# Patient Record
(demographics unavailable — no encounter records)

---

## 2017-04-13 NOTE — DIAGNOSTIC IMAGING REPORT
CHEST ONE VIEW PORTABLE



CLINICAL HISTORY: CHEST PAIN dyspnea



COMPARISON STUDY:  5/3/2015



FINDINGS: The bones soft tissues and hemidiaphragms are normal. The

cardiomediastinal silhouette is normal. The lungs are clear. The pulmonary

vasculature is normal. 



IMPRESSION:  Negative chest. 









Electronically signed by:  Wes Christopher M.D.

4/13/2017 4:10 PM



Dictated Date/Time:  4/13/2017 4:10 PM

## 2017-04-13 NOTE — HISTORY AND PHYSICAL
History & Physical


Date & Time of Service:


Apr 13, 2017 at 19:05


Chief Complaint:


Headache,Vomiting,Fever, Sore Throat, Cough,Copd


Primary Care Physician:


April Mc D.O.


History of Present Illness


Source:  patient, clinic records, hospital records


Patient seen and examined. 58 year old female with PMHx of COPD active tobacco 

abuse, and fibromyalgia presents to the ED complaining of wheezing x 3 days. 

Patient reports she has been wheezing much more than her baseline. She reports 

associated SOB, and cough with sputum production. She reports subjective fevers

, nausea, and a few episodes of vomiting. She states her granddaughter had 

strep throat last week. She states she has been using her nebulizers at home 

which help. She denies chest pain, palpitation, diarrhea, dysuria, calf pain 

and edema. She does not use oxygen at home. She states she started a Zpak last 

night. She did not start any steroids. She has been smoking a pack a day. In 

the ED VS are initially stable, later hypoxia is recorded. Patient is placed on

  2L NC and saturating well. WBC is 18K. CXR is without consolidation. Flu and 

rapid strep are negative. She received Duonebs x 2, Rocephin and IV Solu-

Medrol. She is resting comfortably. She will be admitted for further workup and 

treatment.





Past Medical/Surgical History


Medical Problems:


(1) COPD (chronic obstructive pulmonary disease)


Status: Chronic  





(2) Fibromyalgia


Status: Chronic  





(3) HLD (hyperlipidemia)


Status: Chronic  





(4) HTN (hypertension)


Status: Chronic  





Surgical Problems:


(1) No pertinent past surgical history


Status: Chronic  





Social History Problems:


(1) Active smoker


Status: Chronic  








Family History





Depression


Diabetes mellitus





Social History


Smoking Status:  Current Every Day Smoker


Alcohol Use:  occasionally


Drug Use:  none


Marital Status:  


Housing status:  lives with family


Occupational Status:  unemployed





Immunizations


History of Influenza Vaccine:  No


Influenza Vaccine Date:  Oct 20, 2008


History of Tetanus Vaccine?:  No


History of Pneumococcal:  Yes


History of Hepatitis B Vaccine:  No





Multi-Drug Resistant Organisms


History of MDRO:  No





Allergies


Coded Allergies:  


     ACE Inhibitors (Verified  Allergy, Unknown, UNKNOWN RXN, 5/3/15)


     Zolpidem (Verified  Adverse Reaction, Intermediate, other, 5/3/15)


 irritability, restless legs, wanted to wake up at night


 during sleep





Home Medications


Scheduled


Azithromycin (Azithromycin), 1 TAB PO UD


Citalopram (Citalopram Hydrobromide), 40 MG PO DAILY


Fluticasone Furoate-Vilanterol (Breo Ellipta), 1 PUFF INH QAM





Scheduled PRN


Albuterol Sulfate (Proair Respiclick), 2 PUFFS INH QID PRN for SOB/Wheezing


Ibuprofen (Advil), 400-600 MG PO Q6H PRN for Pain


Ipratropium-Albuterol (Duoneb), 1 TREATMENT INH Q6H PRN for SOB/Wheezing





Review of Systems


See above for pertinent positives & negatives. A total of 10 systems reviewed 

and were otherwise negative.





Physical Exam


Vital Signs











  Date Time  Temp Pulse Resp B/P Pulse Ox O2 Delivery O2 Flow Rate FiO2


 


4/13/17 18:41  97 18 117/70 93 Nasal Cannula 2.0 


 


4/13/17 18:17  98 22 131/69 92 Nasal Cannula  


 


4/13/17 16:54     86 Nasal Cannula 2.0 


 


4/13/17 16:47  96 24 132/76 88 Room Air  


 


4/13/17 16:30  94      


 


4/13/17 16:20     93 Room Air  


 


4/13/17 16:20     93 Room Air  


 


4/13/17 15:08 37.4 113 20 126/85 93 Room Air  








General Appearance:  + pertinent finding (WD/WN 58 year old female lying in bed 

in NAD )


Head:  normocephalic, atraumatic


Eyes:  PERRL, EOMI, sclerae normal


ENT:  hearing grossly normal, pharynx normal


Neck:  supple, no JVD


Respiratory/Chest:  chest non-tender, no respiratory distress, no accessory 

muscle use, + pertinent finding (Coarse breath sounds, good air entry, no 

wheezes noted s/p duoneb x 2)


Cardiovascular:  regular rate, rhythm, no edema, no gallop, no JVD, no murmur, 

normal peripheral pulses


Abdomen/GI:  normal bowel sounds, non tender, soft


Back:  normal inspection, no muscle spasm


Extremities/Musculoskelatal:  no calf tenderness, normal capillary refill, no 

pedal edema


Neurologic/Psych:  alert, oriented x 3, + pertinent finding (no focal deficits 

noted on gross exam )


Skin:  normal color, warm/dry, no rash


Lymphatic:  no adenopathy





Diagnostics


Laboratory Results





Results Past 24 Hours








Test


  4/13/17


16:08 4/13/17


16:10 4/13/17


18:19 Range/Units


 


 


Influenza Type A (RT-PCR) Neg for Influ A   NEG  


 


Influenza Type A Antigen Neg for Influ A   NEG  


 


Influenza Type B Antigen Neg for Influ B   NEG  


 


Influenza Type B (RT-PCR) Neg for Influ B   NEG  


 


White Blood Count  18.22  4.8-10.8  K/uL


 


Red Blood Count  4.55  4.2-5.4  M/uL


 


Hemoglobin  15.2  12.0-16.0  g/dL


 


Hematocrit  43.0  37-47  %


 


Mean Corpuscular Volume  94.5    fL


 


Mean Corpuscular Hemoglobin  33.4  25-34  pg


 


Mean Corpuscular Hemoglobin


Concent 


  35.3


  


  32-36  g/dl


 


 


Platelet Count  239  130-400  K/uL


 


Mean Platelet Volume  9.7  7.4-10.4  fL


 


Neutrophils (%) (Auto)  82.0   %


 


Lymphocytes (%) (Auto)  10.2   %


 


Monocytes (%) (Auto)  6.9   %


 


Eosinophils (%) (Auto)  0.3   %


 


Basophils (%) (Auto)  0.2   %


 


Neutrophils # (Auto)  14.95  1.4-6.5  K/uL


 


Lymphocytes # (Auto)  1.86  1.2-3.4  K/uL


 


Monocytes # (Auto)  1.25  0.11-0.59  K/uL


 


Eosinophils # (Auto)  0.05  0-0.5  K/uL


 


Basophils # (Auto)  0.04  0-0.2  K/uL


 


RDW Standard Deviation  49.6  36.4-46.3  fL


 


RDW Coefficient of Variation  14.5  11.5-14.5  %


 


Immature Granulocyte % (Auto)  0.4   %


 


Immature Granulocyte # (Auto)  0.07  0.00-0.02  K/uL


 


Sodium Level  140  136-145  mmol/L


 


Potassium Level  3.8  3.5-5.1  mmol/L


 


Chloride Level  105    mmol/L


 


Carbon Dioxide Level  27  21-32  mmol/L


 


Anion Gap  8.0  3-11  mmol/L


 


Blood Urea Nitrogen  8  7-18  mg/dl


 


Creatinine


  


  0.65


  


  0.60-1.20


mg/dl


 


Est Creatinine Clear Calc


Drug Dose 


  84.9


  


   ml/min


 


 


Estimated GFR (


American) 


  113.4


  


   


 


 


Estimated GFR (Non-


American 


  97.9


  


   


 


 


BUN/Creatinine Ratio  12.7  10-20  


 


Random Glucose  83  70-99  mg/dl


 


Calcium Level  8.8  8.5-10.1  mg/dl


 


Total Bilirubin  0.7  0.2-1  mg/dl


 


Direct Bilirubin  0.1  0-0.2  mg/dl


 


Aspartate Amino Transf


(AST/SGOT) 


  11


  


  15-37  U/L


 


 


Alanine Aminotransferase


(ALT/SGPT) 


  19


  


  12-78  U/L


 


 


Alkaline Phosphatase  115    U/L


 


Troponin I  < 0.015  0-0.045  ng/ml


 


Total Protein  7.3  6.4-8.2  gm/dl


 


Albumin  3.7  3.4-5.0  gm/dl








 Microbiology Results


4/13/17 Group A Streptococcus Screen - Final, Resulted


          SPECIMEN NEGATIVE FOR GROUP A BETA ST...


4/13/17 Group A Streptococcus Screen ([facility]), Resulted


          Pending





Diagnostic Radiology


CXR


Per radiologist read:





IMPRESSION:  Negative chest.





EKG


NSR 93 BPM, Qtc 432





Impression


Assessment and Plan


58 year old female smoking 1 pack per day presents to the ED complaining of 

increased cough, sputum production, SOB 





ACUTE EXACERBATION OF COPD with acute hypoxia 


-Admit to med/surg 


-CXR without pneumonia, flu, rapid strep negative 


-WBC count 18K, no recent steroids, will trend 


-Became hypoxic on RA during stay saturating well on 2L NC 


-received IV Solu-Medrol, duonebs x 2 and Rocephin, IVFs in ED, feeling much 

better 


-Check sputum culture 


-Will continue Rocephin daily, and Zpak patient started at home yesterday 


-Received 125mg IV  Solu-Medrol in ED will start prednisone 40mg po in AM 


-Duonebs scheduled and prn sob/wheeze


-continue supplemental oxygen as needed 


-Smoking cessation counseling 





FIBROMYALGIA 


-continue Celexa 





HTN 


-per chart, not on home meds


-BP stable, monitor per routine 





TOBACCO ABUSE


-Cessation counseling given 


-Nicotine patch ordered 





DVT PROPHYLAXIS: Sq Lovenox 





CODE STATUS: FULL CODE





DISPO:In my clinical judgment this beneficiary meets acute admission criteria, 

established by CMS, that includes being hospitalized through two midnights.





Patient seen in collaboration with Dr. Begum





VTE Prophylaxis


VTE Risk Assessment Done? Y/N:  Yes


Risk Level:  Moderate


Given or contraindicated:  Enoxaparin (Lovenox)SQ





Note





ATTENDING ADDENDUM





Record reviewed.


Patient interviewed and examined.


Care coordinated with Annita Espinoza PA-C.


Please refer to her documentation for patient's history.





EXAM:





General-  coughing, no acute distress


VS- as noted


Neck-  no JVD


Lungs-  scattered rhonchi, diffuse wheezing


Heart-  RRR


Abdomen-  + BS, soft, nontender


Extremities-  no pretibial edema or calf tenderness


Neuro-  alert





DATA:





WBC 18,220.


Lactate = 0.60.


Other lab studies as noted.





CXR- no infiltrates








ASSESSMENT AND PLAN:





Exacerbation of COPD due to bronchitis.


Rx with azithromycin, ceftriaxone, steroids, bronchodilators.





Please refer to MAKAYLA Espinoza's documentation for discussion of other issues.





Edmond Begum MD


.

## 2017-04-13 NOTE — EMERGENCY ROOM VISIT NOTE
History


First contact with patient:  15:11


Chief Complaint:  ILLNESS


Stated Complaint:  HEADACHE,VOMITING,FEVER, SORE THROAT, COUGH,COPD





History of Present Illness


The patient is a 58 year old female who presents to the Emergency Room with 

complaints of cough, congestion, wheezing, headache, fever, chills for the past 

day.  Other family members are currently sick.  Patient continues to smoke.  

She is COPD.  She tried home with minimal improvement of symptoms.  She took 

some Zithromax that she had for her exacerbations of her COPD.  Patient denies 

chest pain, abdominal pain, vomiting, diarrhea, neck stiffness.  She is 

tolerated by mouth fluids and food.





Review of Systems


See HPI for pertinent positives & negatives. A total of 10 systems reviewed and 

were otherwise negative.





Past Medical/Surgical History


Medical Problems:


(1) Benign hypertension


(2) Bronchitis


(3) COPD (chronic obstructive pulmonary disease)


(4) Pneumonia


Social History Problems:


(1) Active smoker








Social History


Smoking Status:  Current Every Day Smoker


Drug Use:  none


Marital Status:  


Housing Status:  lives with family


Occupation Status:  unemployed





Current/Historical Medications


Scheduled


Albuterol Sulfate (Proair Respiclick), 2 PUFFS INH QID


Azithromycin (Azithromycin), 1 TAB PO UD


Citalopram (Citalopram Hydrobromide), 40 MG PO DAILY


Fluticasone Furoate-Vilanterol (Breo Ellipta), 1 PUFF INH QAM


Ipratropium-Albuterol (Duoneb), 1 TREATMENT INH Q6H





Scheduled PRN


Ibuprofen (Advil), 400-600 MG PO Q6H PRN for Pain





Allergies


Coded Allergies:  


     ACE Inhibitors (Verified  Allergy, Unknown, UNKNOWN RXN, 5/3/15)


     Zolpidem (Verified  Adverse Reaction, Intermediate, other, 5/3/15)


 irritability, restless legs, wanted to wake up at night


 during sleep





Physical Exam


Vital Signs











  Date Time  Temp Pulse Resp B/P Pulse Ox O2 Delivery O2 Flow Rate FiO2


 


4/13/17 18:41  97 18 117/70 93 Nasal Cannula 2.0 


 


4/13/17 18:17  98 22 131/69 92 Nasal Cannula  


 


4/13/17 16:54     86 Nasal Cannula 2.0 


 


4/13/17 16:47  96 24 132/76 88 Room Air  


 


4/13/17 16:30  94      


 


4/13/17 16:20     93 Room Air  


 


4/13/17 16:20     93 Room Air  


 


4/13/17 15:08 37.4 113 20 126/85 93 Room Air  











Physical Exam


VITALS: Vitals are noted on the nurse's note and reviewed by myself.  Vital 

signs stable.


GENERAL: Pleasant female, in no acute distress, nondiaphoretic, well-developed 

well-nourished.


SKIN: The skin was without rashes, erythema, edema, or bruising.  There is no 

tenting of the skin.  Capillary reflex less than 2 seconds.


HEAD: Normocephalic atraumatic.  


EARS: External auditory canals clear, tympanic membranes pearly gray without 

erythema or effusion bilaterally.


EYES: Pupils equal round and reactive to light and accommodation.  Conjunctivae 

without injection, sclerae without icterus.  Extraocular movements intact.  


NOSE: Patent, turbinates without inflammation or discharge.  No sinus 

tenderness.


MOUTH: Mucous membranes moist.     Pharynx without erythema or exudate.  Uvula 

midline.  Airway patent.  Tongue does not deviate.  


NECK: Supple without nuchal rigidity.  No lymphadenopathy.  No thyromegaly.  

Cervical spine is nontender.  No JVD.


HEART: Regular rate and rhythm 


LUNGS: Diffuse inspiratory and end expiratory wheezes, without rales or 

rhonchi.  No dullness to percussion.  No retractions or accessory muscle use.


ABDOMEN: Positive bowel sounds x 4.  Normal tympanic percussion.  Soft, 

nontender, without masses or organomegaly.  Wood sign negative.  No guarding 

or rebound tenderness.


MUSCULOSKELETAL: No muscle atrophy, erythema, or edema noted.   


NEURO: Patient was alert and oriented to person place and time.  Normal 

sensation to light and sharp touch.  No focal neurological deficits.





Medical Decision & Procedures


Laboratory Results


4/13/17 16:10








Red Blood Count 4.55, Mean Corpuscular Volume 94.5, Mean Corpuscular Hemoglobin 

33.4, Mean Corpuscular Hemoglobin Concent 35.3, Mean Platelet Volume 9.7, 

Neutrophils (%) (Auto) 82.0, Lymphocytes (%) (Auto) 10.2, Monocytes (%) (Auto) 

6.9, Eosinophils (%) (Auto) 0.3, Basophils (%) (Auto) 0.2, Neutrophils # (Auto) 

14.95, Lymphocytes # (Auto) 1.86, Monocytes # (Auto) 1.25, Eosinophils # (Auto) 

0.05, Basophils # (Auto) 0.04





4/13/17 16:10

















Test


  4/13/17


16:08 4/13/17


16:10 4/13/17


18:19


 


Influenza Type A (RT-PCR)


  Neg for Influ


A (NEG) 


  


 


 


Influenza Type A Antigen


  Neg for Influ


A (NEG) 


  


 


 


Influenza Type B Antigen


  Neg for Influ


B (NEG) 


  


 


 


Influenza Type B (RT-PCR)


  Neg for Influ


B (NEG) 


  


 


 


White Blood Count


  


  18.22 K/uL


(4.8-10.8) 


 


 


Red Blood Count


  


  4.55 M/uL


(4.2-5.4) 


 


 


Hemoglobin


  


  15.2 g/dL


(12.0-16.0) 


 


 


Hematocrit  43.0 % (37-47)  


 


Mean Corpuscular Volume


  


  94.5 fL


() 


 


 


Mean Corpuscular Hemoglobin


  


  33.4 pg


(25-34) 


 


 


Mean Corpuscular Hemoglobin


Concent 


  35.3 g/dl


(32-36) 


 


 


Platelet Count


  


  239 K/uL


(130-400) 


 


 


Mean Platelet Volume


  


  9.7 fL


(7.4-10.4) 


 


 


Neutrophils (%) (Auto)  82.0 %  


 


Lymphocytes (%) (Auto)  10.2 %  


 


Monocytes (%) (Auto)  6.9 %  


 


Eosinophils (%) (Auto)  0.3 %  


 


Basophils (%) (Auto)  0.2 %  


 


Neutrophils # (Auto)


  


  14.95 K/uL


(1.4-6.5) 


 


 


Lymphocytes # (Auto)


  


  1.86 K/uL


(1.2-3.4) 


 


 


Monocytes # (Auto)


  


  1.25 K/uL


(0.11-0.59) 


 


 


Eosinophils # (Auto)


  


  0.05 K/uL


(0-0.5) 


 


 


Basophils # (Auto)


  


  0.04 K/uL


(0-0.2) 


 


 


RDW Standard Deviation


  


  49.6 fL


(36.4-46.3) 


 


 


RDW Coefficient of Variation


  


  14.5 %


(11.5-14.5) 


 


 


Immature Granulocyte % (Auto)  0.4 %  


 


Immature Granulocyte # (Auto)


  


  0.07 K/uL


(0.00-0.02) 


 


 


Anion Gap


  


  8.0 mmol/L


(3-11) 


 


 


Est Creatinine Clear Calc


Drug Dose 


  84.9 ml/min 


  


 


 


Estimated GFR (


American) 


  113.4 


  


 


 


Estimated GFR (Non-


American 


  97.9 


  


 


 


BUN/Creatinine Ratio  12.7 (10-20)  


 


Calcium Level


  


  8.8 mg/dl


(8.5-10.1) 


 


 


Total Bilirubin


  


  0.7 mg/dl


(0.2-1) 


 


 


Direct Bilirubin


  


  0.1 mg/dl


(0-0.2) 


 


 


Aspartate Amino Transf


(AST/SGOT) 


  11 U/L (15-37) 


  


 


 


Alanine Aminotransferase


(ALT/SGPT) 


  19 U/L (12-78) 


  


 


 


Alkaline Phosphatase


  


  115 U/L


() 


 


 


Troponin I


  


  < 0.015 ng/ml


(0-0.045) 


 


 


Total Protein


  


  7.3 gm/dl


(6.4-8.2) 


 


 


Albumin


  


  3.7 gm/dl


(3.4-5.0) 


 











Medications Administered











 Medications


  (Trade)  Dose


 Ordered  Sig/Carl


 Route  Start Time


 Stop Time Status Last Admin


Dose Admin


 


 Sodium Chloride


  (Nss 1000ml)  1,000 ml @ 


 125 mls/hr  Q8H STAT


 IV  4/13/17 15:57


 4/13/17 23:56  4/13/17 16:19


125 MLS/HR


 


 Methylprednisolone


 Sodium Succinate


  (Solu-Medrol IV)  125 mg  NOW  STAT


 IV  4/13/17 15:57


 4/13/17 15:59 DC 4/13/17 16:19


125 MG


 


 Albuterol/


 Ipratropium


  (Duoneb)  3 ml  NOW  STAT


 INH  4/13/17 15:57


 4/13/17 15:59 DC 4/13/17 16:39


3 ML


 


 Ondansetron HCl


  (Zofran Inj)  4 mg  NOW  STAT


 IV  4/13/17 15:57


 4/13/17 15:59 DC 4/13/17 16:19


4 MG


 


 Famotidine


  (Pepcid 20mg/100


 ml)  20 mg  ONE  STAT


 IV  4/13/17 15:57


 4/13/17 15:59 DC 4/13/17 16:19


20 MG


 


 Albuterol/


 Ipratropium


  (Duoneb)  3 ml  NOW  STAT


 INH  4/13/17 17:34


 4/13/17 17:35 DC 4/13/17 17:47


3 ML


 


 Ceftriaxone Sodium


  (Rocephin Inj)  1 gm  NOW  STAT


 IV  4/13/17 18:22


 4/13/17 18:23 DC 4/13/17 18:39


1 GM











ED Course


Prior records/ancillary studies reviewed.


Triage Nursing notes reviewed.


Additional history obtained from the family.





The patient's history was concerning for respiratory difficulties.





Differential diagnosis:


Etiologies such as infections, reactive airway disease, pneumonia, pneumothorax

, COPD, CHF, cardiac ischemia, pulmonary embolism, musculoskeletal, 

gastrointestinal, as well as others were entertained.





Physical examination:


As above. 





ER treatment provided:


Steroids, nebulizer


On reassessment the patient felt better.





Diagnostic interpretation by me:


The electrocardiogram was negative for acute ischemic or pathologic change.  

Normal sinus, normal intervals, no acute ST-T wave changes.  Impression normal 

sinus rhythm interpreted by myself





The labs revealed leukocytosis, no anemia negative flu.  Negative strep test





Imaging studies:


Chest x-ray as above.


CHEST ONE VIEW PORTABLE





CLINICAL HISTORY: CHEST PAIN dyspnea





COMPARISON STUDY:  5/3/2015





FINDINGS: The bones soft tissues and hemidiaphragms are normal. The


cardiomediastinal silhouette is normal. The lungs are clear. The pulmonary


vasculature is normal. 





IMPRESSION:  Negative chest. 














Electronically signed by:  Wes Christopher M.D.





Consultation: Hospitalist service from [facility] was consulted, ZAC Ariza 

and will evaluate the patient for possible admission.  Please see their 

dictation for further admission related to treatment plan.





This appears to be consistent with COPD exacerbation.  Patient's pulse ox did 

drop to the mid 80s.  Patient does not normally wear oxygen.  Patient no 

pneumonia on x-ray.   Patient will be evaluated by medicine for possible 

admission.  By the evaluation outlined above emergent etiologies such as CHF, 

cardiac ischemia, pulmonary embolism, pneumonia, pneumothorax, musculoskeletal, 

serious bacterial infections, as well as others were deemed relatively unlikely.





The pt informed about the findings as listed above. All questions were answered 

and  pleased with the treatment. 


Case reviewed with my attending





Medical Decision


As above





Impression





 Primary Impression:  


 COPD exacerbation


 Additional Impression:  


 Hypoxemia





Departure Information


Dispostion


Being Evaluated By Hospitalist





Condition


FAIR





Referrals


April Mc D.O. (PCP)





Patient Instructions


My [facility]





Additional Instructions











Problem Qualifiers

## 2017-04-14 NOTE — PROGRESS NOTE
Internal Med Progress Note


Date of Service:


Apr 14, 2017.


Provider Documentation:





SUBJECTIVE:





sitting on the chair comfortably


says her sob and cough little better than yesterday


afebrile


no chest pain


want to go home








OBJECTIVE:





Vital Signs-as noted below





Exam:


General-alert and awake and oriented


ENT-normal hearing


Neck-no neck masses


Lungs-mild b/l diminished breath sounds  no wheezing or crackles


Heart-s1 and s2 heard regular rate and rhythm no murmurs


Abdomen-soft bowel sounds present non tender no distension 


Extremities- no erythema no edema


Neuro-alert and awake


moves extremities





Lab data as noted below.


ASSESSMENT & PLAN:


58 year old female smoking 1 pack per day presents to the ED complaining of 

increased cough, sputum production, SOB 





ACUTE EXACERBATION OF COPD with acute hypoxia 


on room air


on iv steroids, nebs and abx


cxr no pneumonia


improving


will continue same .





FIBROMYALGIA 


On  Celexa 





HTN 


as per records


not on meds


will monitor





TOBACCO ABUSE


Smoking Cessation counseling


On Nicotine patch 





DVT PROPHYLAXIS: Sq Lovenox 





CODE STATUS: FULL CODE





DISPOSITION


possible d/c in am


Vital Signs:











  Date Time  Temp Pulse Resp B/P Pulse Ox O2 Delivery O2 Flow Rate FiO2


 


4/14/17 16:15  90 16  93 Room Air  


 


4/14/17 14:44 36.9 91 18 112/72 98   


 


4/14/17 12:35     95 Nasal Cannula 2.0 


 


4/14/17 12:30     89 Room Air  


 


4/14/17 11:05  91 16  93 Room Air  


 


4/14/17 08:00      Room Air  


 


4/14/17 07:43 36.8 78 18 116/79 94   


 


4/14/17 07:08  66 16  97 Nasal Cannula 2.0 


 


4/14/17 00:08 36.6 69 16 105/69 94  2.0 


 


4/14/17 00:00     93 Nasal Cannula 2.0 


 


4/13/17 23:39  93 16  93 Nasal Cannula 2.0 


 


4/13/17 20:39     91 Nasal Cannula 2.0 


 


4/13/17 20:38 37.0 98 18 134/83 91 Nasal Cannula 2.0 


 


4/13/17 20:04  91 18 116/69 92   


 


4/13/17 19:33  92 18  92 Nasal Cannula 2.0 


 


4/13/17 18:41  97 18 117/70 93 Nasal Cannula 2.0 


 


4/13/17 18:17  98 22 131/69 92 Nasal Cannula  


 


4/13/17 16:54     86 Nasal Cannula 2.0 


 


4/13/17 16:47  96 24 132/76 88 Room Air  


 


4/13/17 16:30  94      


 


4/13/17 16:20     93 Room Air  


 


4/13/17 16:20     93 Room Air  








Lab Results:





Results Past 24 Hours








Test


  4/13/17


19:02 4/13/17


19:07 4/14/17


06:25 Range/Units


 


 


Venous Blood pH 7.40   7.36-7.41  


 


Venous Blood Partial Pressure


CO2 42


  


  


  38.0-50.0  mmHg


 


 


Venous Blood Partial Pressure


O2 59


  


  


   mmHg


 


 


Venous Blood HCO3 26    meq/L


 


Venous Blood Oxygen Saturation 88.4    %


 


Venous Blood Base Excess 0.7    mmol/L


 


Bedside Lactic Acid Venous


  


  0.60


  


  0.90-1.70


mmol/L


 


White Blood Count   16.95 4.8-10.8  K/uL


 


Red Blood Count   4.27 4.2-5.4  M/uL


 


Hemoglobin   13.4 12.0-16.0  g/dL


 


Hematocrit   40.2 37-47  %


 


Mean Corpuscular Volume   94.1   fL


 


Mean Corpuscular Hemoglobin   31.4 25-34  pg


 


Mean Corpuscular Hemoglobin


Concent 


  


  33.3


  32-36  g/dl


 


 


RDW Standard Deviation   49.4 36.4-46.3  fL


 


RDW Coefficient of Variation   14.4 11.5-14.5  %


 


Platelet Count   229 130-400  K/uL


 


Mean Platelet Volume   9.6 7.4-10.4  fL


 


Sodium Level   141 136-145  mmol/L


 


Potassium Level   4.1 3.5-5.1  mmol/L


 


Chloride Level   107   mmol/L


 


Carbon Dioxide Level   26 21-32  mmol/L


 


Anion Gap   8.0 3-11  mmol/L


 


Blood Urea Nitrogen   10 7-18  mg/dl


 


Creatinine


  


  


  0.59


  0.60-1.20


mg/dl


 


Est Creatinine Clear Calc


Drug Dose 


  


  93.9


   ml/min


 


 


Estimated GFR (


American) 


  


  117.1


   


 


 


Estimated GFR (Non-


American 


  


  101.0


   


 


 


BUN/Creatinine Ratio   16.1 10-20  


 


Random Glucose   126 70-99  mg/dl


 


Calcium Level   8.7 8.5-10.1  mg/dl








 Microbiology Results


4/13/17 Group A Streptococcus Screen - Final, Resulted


          SPECIMEN NEGATIVE FOR GROUP A BETA ST...


4/13/17 Group A Streptococcus Screen ([facility]) - Preliminary, Resulted


          NO BETA STREP ISOLATED TO DATE.


4/14/17 Gram Stain, Received


          Pending


4/14/17 Sputum Culture, Received


          Pending

## 2017-04-15 NOTE — DISCHARGE SUMMARY
Discharge Summary


Date of Service


Apr 15, 2017.





Discharge Summary


Admission Date:


Apr 13, 2017 at 19:02


Discharge Date:  Apr 15, 2017


Discharge Disposition:  Home


Principal Diagnosis:


COPD EX


Secondary Diagnoses/Problems:


(1) COPD (chronic obstructive pulmonary disease)


Status: Chronic  





(2) Fibromyalgia


Status: Chronic  





(3) HLD (hyperlipidemia)


Status: Chronic  





(4) HTN (hypertension)


Status: Chronic


Procedures:


CXR:


 Negative chest.





Medication Reconciliation


New Medications:  


Doxycycline (Monohydrate) (Monodox) 100 Mg Cap


100 MG PO BID, #10 CAP





Prednisone Tab (Prednisone) 10 Mg Tab


40 MG PO UD, #21 TAB


PREDNSIONE 40MG PO DAILY X 2 DAYS THEN


 PREDNSIONE 30MG PO DAILY X 2 DAYS THEN


 PREDNSIONE 20MG PO DAILY X 2 DAYS THEN


 PREDNSIONE 10MG PO DAILY X 2 DAYS THEN


 PREDNSIONE 5MG PO DAILY X 2 DAYS THEN STOP.


 


 


Continued Medications:  


Albuterol Sulfate (Proair Respiclick) 108 Mcg/Act Aer


2 PUFFS INH QID PRN for SOB/Wheezing





Citalopram (Citalopram Hydrobromide) 40 Mg Tab


40 MG PO DAILY





Fluticasone Furoate-Vilanterol (Breo Ellipta) 1 Inh Inh


1 PUFF INH QAM





Ibuprofen (Advil) 200 Mg Tab


400-600 MG PO Q6H PRN for Pain, TAB





Ipratropium-Albuterol (Duoneb) 3 Ml Nebu


1 TREATMENT INH Q6H PRN for SOB/Wheezing, INHA





 


Discontinued Medications:  


Azithromycin (Azithromycin) 1 Pkt Tab


1 TAB PO UD, #6


PRESCRIBED 04/12/2017, TAKE 2 TABLETS DAY 1 THEN 1 TABLET DAILY UNTIL


 GONE








Admission Information


HPI (per Admitting provider):


Patient seen and examined. 58 year old female with PMHx of COPD active tobacco 

abuse, and fibromyalgia presents to the ED complaining of wheezing x 3 days. 

Patient reports she has been wheezing much more than her baseline. She reports 

associated SOB, and cough with sputum production. She reports subjective fevers

, nausea, and a few episodes of vomiting. She states her granddaughter had 

strep throat last week. She states she has been using her nebulizers at home 

which help. She denies chest pain, palpitation, diarrhea, dysuria, calf pain 

and edema. She does not use oxygen at home. She states she started a Zpak last 

night. She did not start any steroids. She has been smoking a pack a day. In 

the ED VS are initially stable, later hypoxia is recorded. Patient is placed on

  2L NC and saturating well. WBC is 18K. CXR is without consolidation. Flu and 

rapid strep are negative. She received Duonebs x 2, Rocephin and IV Solu-

Medrol. She is resting comfortably. She will be admitted for further workup and 

treatment.


Physical Exam (per Admitting):  


   General Appearance:  + pertinent finding (WD/WN 58 year old female lying in 

bed in NAD )


   Head:  normocephalic, atraumatic


   Eyes:  PERRL, EOMI, sclerae normal


   ENT:  hearing grossly normal, pharynx normal


   Neck:  supple, no JVD


   Respiratory/Chest:  chest non-tender, no respiratory distress, no accessory 

muscle use, + pertinent finding (Coarse breath sounds, good air entry, no 

wheezes noted s/p duoneb x 2)


   Cardiovascular:  regular rate, rhythm, no edema, no gallop, no JVD, no murmur

, normal peripheral pulses


   Abdomen/GI:  normal bowel sounds, non tender, soft


   Back:  normal inspection, no muscle spasm


   Extremities/Musculoskelatal:  no calf tenderness, normal capillary refill, 

no pedal edema


   Neurologic/Psych:  alert, oriented x 3, + pertinent finding (no focal 

deficits noted on gross exam )


   Skin:  normal color, warm/dry, no rash


   Lymphatic:  no adenopathy


Physical Exam (per Admitting):


General Appearance:  + pertinent finding (WD/WN 58 year old female lying in bed 

in NAD )


Head:  normocephalic, atraumatic


Eyes:  PERRL, EOMI, sclerae normal


ENT:  hearing grossly normal, pharynx normal


Neck:  supple, no JVD


Respiratory/Chest:  chest non-tender, no respiratory distress, no accessory 

muscle use, + pertinent finding (Coarse breath sounds, good air entry, no 

wheezes noted s/p duoneb x 2)


Cardiovascular:  regular rate, rhythm, no edema, no gallop, no JVD, no murmur, 

normal peripheral pulses


Abdomen/GI:  normal bowel sounds, non tender, soft


Back:  normal inspection, no muscle spasm


Extremities/Musculoskelatal:  no calf tenderness, normal capillary refill, no 

pedal edema


Neurologic/Psych:  alert, oriented x 3, + pertinent finding (no focal deficits 

noted on gross exam )


Skin:  normal color, warm/dry, no rash


Lymphatic:  no adenopathy





Hospital Course


58 year old female smoking 1 pack per day presents to the ED complaining of 

increased cough, sputum production, SOB 





ACUTE EXACERBATION OF COPD with acute hypoxia 


on room air


on iv steroids, nebs and abx


cxr no pneumonia


improving


was on Rocephin and azithromycin and developed facial swelling improved with 

Benadryl.


discharged on po doxycycline and steroid taper.


stable





FIBROMYALGIA 


On  Celexa 





HTN 


as per records


not on meds


will monitor





TOBACCO ABUSE


Smoking Cessation counseling


On Nicotine patch 





Discharged home


Total time spent on discharge = 35MINUTES


This includes examination of the patient, discharge planning, medication 

reconciliation, and communication with other providers.





Discharge Instructions


Discharge Instructions


Date of Service


Apr 15, 2017.





Admission


Reason for Admission:  Copd Exacerbation, Hypoxemia





Discharge


Discharge Diagnosis / Problem:  COPD EX





Discharge Goals


Goal(s):  Decrease discomfort, Improve function





Activity Recommendations


Activity Limitations:  resume your previous activity





.





Instructions / Follow-Up


Instructions / Follow-Up


FOLLOWUP WITH FAMILY DOCTOR April Johns ON APRIL 19TH AT 10:45AM





Current Hospital Diet


Patient's current hospital diet: AHA Diet (Heart Healthy)





Discharge Diet


Recommended Diet:  Regular Diet





Pending Studies


Studies pending at discharge:  no





Medical Emergencies








.


Who to Call and When:





Medical Emergencies:  If at any time you feel your situation is an emergency, 

please call 911 immediately.





.





Non-Emergent Contact


Non-Emergency issues call your:  Primary Care Provider





.


.





"Provider Documentation" section prepared by Alejandro Del Toro.





VTE Core Measure


Inpt VTE Proph given/why not?:  Enoxaparin (Lovenox)SQ

## 2017-04-15 NOTE — PROGRESS NOTE
Internal Med Progress Note


Date of Service:


Apr 15, 2017.


Provider Documentation:





SUBJECTIVE:





resting  comfortably


cough and sob improved


has some face swelling last night thought to be from abx abut improved with 

Benadryl.


denies any other complaints


wants to be discharged





OBJECTIVE:





Vital Signs-as noted below





Exam:


General-alert and awake and oriented


ENT-normal hearing


Neck-no neck masses


Lungs-cta b/l  no wheezing or crackles


Heart-s1 and s2 heard regular rate and rhythm no murmurs


Abdomen-soft bowel sounds present non tender no distension 


Extremities- no erythema no edema


Neuro-alert and awake


moves extremities





Lab data as noted below.


ASSESSMENT & PLAN:


58 year old female smoking 1 pack per day presents to the ED complaining of 

increased cough, sputum production, SOB 





ACUTE EXACERBATION OF COPD with acute hypoxia 


on room air


on iv steroids, nebs and abx


cxr no pneumonia


improving


was on Rocephin and azithromycin and developed facial swelling improved with 

Benadryl.


discharged on po doxycycline and steroid taper.


stable





FIBROMYALGIA 


On  Celexa 





HTN 


as per records


not on meds


will monitor





TOBACCO ABUSE


Smoking Cessation counseling


On Nicotine patch 





Discharged home


Vital Signs:











  Date Time  Temp Pulse Resp B/P Pulse Ox O2 Delivery O2 Flow Rate FiO2


 


4/15/17 11:25     93 Room Air  


 


4/15/17 11:20     93 Room Air  


 


4/15/17 11:16  81 16  93 Room Air  


 


4/15/17 11:16 36.8 75 16  92 Room Air  


 


4/15/17 08:00     92 Room Air  


 


4/15/17 07:22  75 16  98 Nasal Cannula 1.5 


 


4/15/17 07:20 36.8 72 16 108/68 97 Nasal Cannula 1.0 


 


4/15/17 00:15 36.6 85 16 125/74 95  1.5 


 


4/14/17 23:28      Nasal Cannula 1.5 


 


4/14/17 19:10  88 16  98 Nasal Cannula 2.0

## 2017-04-15 NOTE — DISCHARGE INSTRUCTIONS
Discharge Instructions


Date of Service


Apr 15, 2017.





Admission


Reason for Admission:  Copd Exacerbation, Hypoxemia





Discharge


Discharge Diagnosis / Problem:  COPD EX





Discharge Goals


Goal(s):  Decrease discomfort, Improve function





Activity Recommendations


Activity Limitations:  resume your previous activity





.





Instructions / Follow-Up


Instructions / Follow-Up


FOLLOWUP WITH FAMILY DOCTOR Apirl Johns ON APRIL 19TH AT 10:45AM





Current Hospital Diet


Patient's current hospital diet: AHA Diet (Heart Healthy)





Discharge Diet


Recommended Diet:  Regular Diet





Pending Studies


Studies pending at discharge:  no





Medical Emergencies








.


Who to Call and When:





Medical Emergencies:  If at any time you feel your situation is an emergency, 

please call 911 immediately.





.





Non-Emergent Contact


Non-Emergency issues call your:  Primary Care Provider





.


.





"Provider Documentation" section prepared by Alejandro Del Toro.





VTE Core Measure


Inpt VTE Proph given/why not?:  Enoxaparin (Lovenox)SQ

## 2017-11-20 NOTE — PAP/PSG TECHNICIAN REPORT
[facility]

Technician Polysomnogram Report



Study name:

None

Report date:

11/20/2017



Study date:

11/19/2017

Referring Physician:

 Char Dutta



Name:

CONTRERAS RIVERS 

Interpreting Physician:

Edmond Armijo M.D.



YOB: 1958

Technician:

ANCA Paredes.



Sex:

Female







Age:

59

StudyType:

PSG



Weight:

155 lbs









Height:

59 years, Height 5' 1"

Neck Circum:16.5inches







BMI:

29.28







Medications:

Oxygen 2LPM during sleep, Celexa 40mg, Breo Ellipta 100-25 mcg/inh, Duoneb 2.5-0.5mg/3ml, Xanax 
0.25mg, Proair HFA 108mcg/act, Prilosec 20mg, Ibuprofen 600mg















Patient History





Study started on room air with ETCO2 monitoring in room #8. 59 yr old female here tonight for a 
possible split study if her AHI>5. She had a HST that was inconclusive. She has significant 
hypoxemia and airway disease. She has a history of asthma and COPD. She is a smoker. She is on 2lpm 
oxygen at night. Her ESS=18/24. Neck circ=16.5inches.







Parameters Monitored

NPSG: E1-M2, E2-M1, Fp1-M2, Fp2-M1, F3-M2, F4-M2, F4-M1, C3-M2, C4-M2, C4-M1, O1-M2, O2-M2,

O2-M1, T3-M2, T4-M1, P3-M2, P4-M1, CHIN1, CHIN2, HR, EKG, Legs, PFLOW, SNOR, FLOW, CFLOW,

Tidal Volume, THOR, ABDO, SpO2, PLTH, CPRESS, ETCO2 Wave, ETCO2, pH





Sleep Architecture



Sleep Stages



Time at Lights Off

10:15:57 PM



STAGES

Time (min.)

TST (%)



Time at Lights On

5:18:57 AM



Wake

124.0

--



Total Recording Time (TRT)

423.00 min.



N1

22.5

8



Total Sleep Period (TSP)

358.5 min.



N2

230.0

77



Total Sleep Time (TST)

299.0min.



N3

33.0

11



Awake Time

124.0 min.



REM

13.5

5



Wake after Sleep Onset

60.0 min.











Sleep Efficiency (SE)

71 %











Sleep Onset Latency (SOL)

64.0 min.











Number of Stage 1 Shifts

None











Awakenings

32











Stage Changes

137











Number of REM periods

1



REM

13.5

5



REM Latency

220.0 min.



NREM

285.5

95





Body Position Analysis





Supine

Right

Left

Side

Prone

Vertical



Total Sleep Time (min.)

157.5

56.0

88.2

144.23

50.9

0.0



Total Sleep Time (%)

42%

19%

30%

48

9%

N/A%



Total Sleep Time REM (min.)

13.5

0.0

0.0

None

0.0

0.0



Total Sleep Time NREM (min.)

113.0

56.0

88.2

None

28.3

0.0



Intermittent Wake (min.)

31.0

16.0

54.4

None

22.7

0.0



Total Sleep Period (%)

40%

None





Arousals







Myoclonus (PLM) *







Events

Count

Index



Events

Count

Index



Spontaneous

22

4



Events Awake (PLMW)

135

65.3



Respiratory

0

0.2



Events Asleep w/ Arousal (PLMA)

39

7.8



PLM

39

8



Events Asleep w/o Arousal (PLMS)

246

49.4



Snoring

14

3



Total Asleep

285

57.2



Total

75

15



Total

420

60







Respiratory Analysis *





CA

OA

MA

CH

H

RERA

Total



Count

0

4

0

0

4

0

8



Index

0.0

0.8

0.0

0

0.8

0

1.6



Mean Duration

0.0

13.1

0.0

0.00

17.1

0.0

15.1



Longest Duration

0.0

16.2

0.0

0.00

0.0

0.0

21.2







Respiratory Event Summary 







Total

Supine

~Supine

Right

Left

Prone

REM

NREM



Apneas

Count

4

4

0

4





Index

0.8

2

0

0.0

0.0

0

0

1



Hypopneas (4% Desat)

Count

4

4

0

3

1





Index

0.8

1.9

0

0.0

13.3

0.2



Apneas & All Hypopneas 

Count

8

8

0

3

5





Index

1.6

4

0

13.3

1.1



Respiratory Events 

(Apn+All Hyp+RERA)

Count

8

8

0

3

5





Index

1.6

4

0

0.0

13.3

1.1



Respiratory Related Arousal

Count

0

8

0

1





Index

0.2

0





Snoring Analysis





Supine

Right

Left

Prone

REM

NREM

Total



Snore duration

37.8 min



Snores count

658

17

418

9

33

1,069

1,102



Snore mean duration

2.1 Sec



Snores index

312

18

284

19

146.7

224.7

221.1



TST with snoring (%)

12.6%





SpO2 Analysis



Total

REM

NREM

Awake



<50%

0.0 min.



51 - 60%

0.0 min.



61 - 70%

0.0 min.



71 - 80%

0.1 min.

0.1 min.

0.0 min.

0.0 min.



81 - 90%

396.6 min.

13.4 min.

266.2 min.

117.0 min.



91 - 100%

21.5 min.

0.0 min.

17.0 min.

4.5 min.



Average

87

84

87

88



Minimum SpO2

79

79

83

81



Desaturation Event Index

3.4

35.6

2.1

2.9



# Desat. Events below 89%

24

8

10

6



Time(%) with Saturation below 89%

70.1

3.2

46.0

20.9



Time(min.) with Saturation below 89%

293.2

13.4

192.3

87.4







Heart Rate Analysis



End Tidal CO2 Analysis





Min (bpm)

Max (bpm)

Average (bpm)





TSP (mins)

% of TSP



Awake

64

155

73



Above 55 mmHg

0.0

0.0



NREM

62

127

71



50-55 mmHg

0.0

0.0



REM

60

80

72



45-50 mmHg

3.8

1.3



Overall

60

127

71



40-45 mmHg

154.2

51.6













35-40 mmHg

84.6

28.3













30-35 mmHg

21.7

7.3































Average ETCO2

0.1





Supplemental O2 Values



Minimum O2 level: None



Value  

Start Time

End Time





Technician Comments





Mrs. Rivers slept in the right, left, supine and prone positions.  No cardiac arrhythmia noted. 
PLM's noted and her legs were bothering her in the beginning of the study and she had a difficult 
time staying asleep. No bruxism noted.  Snoring was noted and scored as a 3 on a scale of 1 through 
5. (0=no snoring, 5=snoring loud enough to be heard through a closed door or down the signh way) She 
did not use the restroom during the night.  She stated that she slept worse than when at home. She 
did not qualify for a split night study. 

The final report will be interpreted and signed by a sleep physician. The completed physician report 
will then be placed in the patient medical record.









 



 



 



 



 



 

 



Therapy (cm H2O)

0



TIB (min.)

423.0



TST (min.)

299.0



Sleep Onset (min.)

64.0



REM Onset From Sleep (min.)

220.0



Sleep Efficiency %

71



Wakefulness (%)

29



Wakefulness (min.)

124.0



NREM 1 (%)

8



NREM 1 (min.)

22.5



NREM 2 (%)

77



NREM 2 (min.)

230.0



NREM 3 (%)

11



NREM 3 (min.)

33.0







REM (%)

5



REM (min.)

13.5



# Arousals

75



Arousal Index

15



# Snore

1,102



Snore Index

221.1



AHI

1.6



AHI Supine

4



AHI Non-Supine

0



NREM AHI

1.1



REM AHI

13.3



RDI

1.6



# Obstructive Apnea

4



# Central Apnea

0



# Mixed Apnea

0







# Hypopneas

4



RERAs

0



Total Respiratory Events

10



Time Below SpO2 89% (min.)

205.7



Mean NREM SpO2 (%)

87



Mean REM SpO2 (%)

84



Mean Sleep SpO2 (%)

87



Min NREM SpO2 (%)

83



Min REM SpO2 (%)

79



Position Supine (min.)

157.5



Position Non-supine (min.)

172.5



LM Index Sleep

57.2



LM Index NREM

59.7



LM Index REM

4.4







Mean Heart Rate (bpm)

71



Min Heart Rate (bpm)

60

## 2017-11-21 NOTE — POLYSOMNOGRAPH REPORT
CLINICAL DATA:  A 59-year-old female with BMI of 29.3, referred by Char Dutta for possible split-night sleep study.  She had a home sleep study

that was inconclusive.  She does have nocturnal hypoxemia and airways disease

with a history of asthma /COPD.  She is a smoker and is on oxygen 2 liters

per minute.  Her [place] Sleepiness score is 18/24.

 

SLEEP ARCHITECTURE:  Total sleep period was 358.5 minutes.  Total sleep time

was 299 minutes divided between 285.5 minutes of non-REM sleep and 13.5

minutes of REM sleep.  Sleep onset latency was 64 minutes.  REM latency was

220 minutes.  Sleep efficiency was 71%.  Wake after sleep onset was 16

minutes.  Sleep consisted of stage N1 8%, stage N2 77%, stage N3 11% and REM

5%.

 

AROUSAL DATA:  75 arousals were recorded for an index of 15 per hour.  39 of

the arousals were due to PLMs for an index of 8 per hour.

 

PERIODIC LIMB MOVEMENT DATA:  285 limb movements during sleep were noted for

an index of 57.2 per hour with arousal index of 7.8 per hour.

 

RESPIRATORY DATA:  There was no evidence of clinically significant sleep

apnea.  The AHI was 1.6.  There were 4 obstructive apneic episodes.  The

longest apneic episode was 16.2 seconds.  There were 4 hypopneic episodes. 

The mean duration of hypopnea was 17.1 seconds.

 

OXIMETRY DATA:  Nocturnal hypoxemia was seen.  Oxygen jhon was 79% during

REM.  Mean saturation was 87%.  Time below 89% was 293.2 minutes.

 

EKG:  Heart rates ranged from  beats per minute.  No arrhythmias were

noted.

 

TECHNICIAN'S COMMENTS:  The patient slept in the right, left, supine and

prone positions.  PLMs were noted and her legs were bothering her at the

beginning of the study and she had a difficult time staying asleep because of

leg discomfort.  She had moderate snoring, rated 3 on a scale of 1-5.

 

IMPRESSION:

1.  No evidence of clinically significant sleep apnea/hypopnea.

2.  Significant nocturnal hypoxemia.

3.  Very frequent limb movements during sleep consistent with periodic limb

movement disorder.

 

RECOMMENDATIONS:  The patient should continue to be treated with oxygen. 

Treatment for PLMD may be of benefit.  Clinical correlation is needed.

 

 

MTDD